# Patient Record
Sex: MALE | Race: WHITE | NOT HISPANIC OR LATINO | Employment: FULL TIME | URBAN - METROPOLITAN AREA
[De-identification: names, ages, dates, MRNs, and addresses within clinical notes are randomized per-mention and may not be internally consistent; named-entity substitution may affect disease eponyms.]

---

## 2018-05-28 ENCOUNTER — CLINICAL SUPPORT (OUTPATIENT)
Dept: PRIMARY CARE CLINIC | Facility: CLINIC | Age: 28
End: 2018-05-28
Payer: COMMERCIAL

## 2018-05-28 ENCOUNTER — OFFICE VISIT (OUTPATIENT)
Dept: PRIMARY CARE CLINIC | Facility: CLINIC | Age: 28
End: 2018-05-28
Payer: COMMERCIAL

## 2018-05-28 VITALS
RESPIRATION RATE: 18 BRPM | SYSTOLIC BLOOD PRESSURE: 130 MMHG | OXYGEN SATURATION: 97 % | HEART RATE: 88 BPM | BODY MASS INDEX: 31.46 KG/M2 | DIASTOLIC BLOOD PRESSURE: 71 MMHG | TEMPERATURE: 99 F | WEIGHT: 224.69 LBS | HEIGHT: 71 IN

## 2018-05-28 DIAGNOSIS — R21 PENILE RASH: Primary | ICD-10-CM

## 2018-05-28 DIAGNOSIS — Z13.220 LIPID SCREENING: ICD-10-CM

## 2018-05-28 DIAGNOSIS — R21 PENILE RASH: ICD-10-CM

## 2018-05-28 LAB
ALBUMIN SERPL BCP-MCNC: 4.7 G/DL
ALP SERPL-CCNC: 58 U/L
ALT SERPL W/O P-5'-P-CCNC: 13 U/L
ANION GAP SERPL CALC-SCNC: 7 MMOL/L
AST SERPL-CCNC: 24 U/L
BASOPHILS # BLD AUTO: 0 K/UL
BASOPHILS NFR BLD: 0.7 %
BILIRUB SERPL-MCNC: 1 MG/DL
BUN SERPL-MCNC: 17 MG/DL
CALCIUM SERPL-MCNC: 9.6 MG/DL
CHLORIDE SERPL-SCNC: 106 MMOL/L
CHOLEST SERPL-MCNC: 205 MG/DL
CHOLEST/HDLC SERPL: 3.7 {RATIO}
CO2 SERPL-SCNC: 30 MMOL/L
CREAT SERPL-MCNC: 1 MG/DL
DIFFERENTIAL METHOD: ABNORMAL
EOSINOPHIL # BLD AUTO: 0.1 K/UL
EOSINOPHIL NFR BLD: 1.5 %
ERYTHROCYTE [DISTWIDTH] IN BLOOD BY AUTOMATED COUNT: 13.3 %
ERYTHROCYTE [SEDIMENTATION RATE] IN BLOOD BY WESTERGREN METHOD: 3 MM/HR
EST. GFR  (AFRICAN AMERICAN): >60 ML/MIN/1.73 M^2
EST. GFR  (NON AFRICAN AMERICAN): >60 ML/MIN/1.73 M^2
GLUCOSE SERPL-MCNC: 85 MG/DL
HCT VFR BLD AUTO: 46.5 %
HDLC SERPL-MCNC: 55 MG/DL
HDLC SERPL: 26.8 %
HGB BLD-MCNC: 15.7 G/DL
LDLC SERPL CALC-MCNC: 134 MG/DL
LYMPHOCYTES # BLD AUTO: 2.2 K/UL
LYMPHOCYTES NFR BLD: 40.4 %
MCH RBC QN AUTO: 30.8 PG
MCHC RBC AUTO-ENTMCNC: 33.9 G/DL
MCV RBC AUTO: 91 FL
MONOCYTES # BLD AUTO: 0.5 K/UL
MONOCYTES NFR BLD: 9.1 %
NEUTROPHILS # BLD AUTO: 2.6 K/UL
NEUTROPHILS NFR BLD: 48.3 %
NONHDLC SERPL-MCNC: 150 MG/DL
PLATELET # BLD AUTO: 252 K/UL
PMV BLD AUTO: 8.2 FL
POTASSIUM SERPL-SCNC: 4.3 MMOL/L
PROT SERPL-MCNC: 7.8 G/DL
RBC # BLD AUTO: 5.12 M/UL
SODIUM SERPL-SCNC: 143 MMOL/L
TRIGL SERPL-MCNC: 79 MG/DL
WBC # BLD AUTO: 5.5 K/UL

## 2018-05-28 PROCEDURE — 99999 PR PBB SHADOW E&M-EST. PATIENT-LVL II: CPT | Mod: PBBFAC,,,

## 2018-05-28 PROCEDURE — 86038 ANTINUCLEAR ANTIBODIES: CPT

## 2018-05-28 PROCEDURE — 99202 OFFICE O/P NEW SF 15 MIN: CPT | Mod: S$GLB,,, | Performed by: INTERNAL MEDICINE

## 2018-05-28 PROCEDURE — 99999 PR PBB SHADOW E&M-NEW PATIENT-LVL III: CPT | Mod: PBBFAC,,, | Performed by: INTERNAL MEDICINE

## 2018-05-28 PROCEDURE — 3008F BODY MASS INDEX DOCD: CPT | Mod: CPTII,S$GLB,, | Performed by: INTERNAL MEDICINE

## 2018-05-28 PROCEDURE — 86694 HERPES SIMPLEX NES ANTBDY: CPT

## 2018-05-28 PROCEDURE — 86592 SYPHILIS TEST NON-TREP QUAL: CPT

## 2018-05-28 RX ORDER — CLOTRIMAZOLE AND BETAMETHASONE DIPROPIONATE 10; .64 MG/G; MG/G
CREAM TOPICAL 2 TIMES DAILY
Qty: 30 G | Refills: 1 | Status: SHIPPED | OUTPATIENT
Start: 2018-05-28

## 2018-05-29 LAB
ANA SER-ACNC: NORMAL
RPR SER QL: NORMAL

## 2018-05-30 LAB — HSV AB, IGM BY EIA: NEGATIVE

## 2018-05-30 NOTE — PROGRESS NOTES
Subjective:       Patient ID: Jagdeep Chin is a 28 y.o. male.    Chief Complaint: Sores On Genital Area    HPI  Pt c/o sores on the penis last 1-2 weeks seem spreading no pain no itching no penile d/c no swollen glands no h/o STD no other skin rash n joint pain no fever chill not on nay meds denies any PMH  Review of Systems   Constitutional: Negative for unexpected weight change.   HENT: Negative for congestion, nosebleeds and rhinorrhea.    Eyes: Negative for visual disturbance.   Respiratory: Negative for cough, choking and chest tightness.    Cardiovascular: Negative for chest pain and palpitations.   Gastrointestinal: Negative for abdominal pain, constipation and diarrhea.   Genitourinary: Negative for discharge, dysuria, testicular pain and urgency.   Musculoskeletal: Negative for arthralgias and myalgias.   Skin: Negative for rash.   Neurological: Negative.    Psychiatric/Behavioral: Negative for agitation and behavioral problems. The patient is not nervous/anxious.        Objective:      Physical Exam   Constitutional: He is oriented to person, place, and time. He appears well-developed and well-nourished. No distress.   HENT:   Head: Normocephalic and atraumatic.   Right Ear: External ear normal.   Left Ear: External ear normal.   Nose: Nose normal.   Mouth/Throat: Oropharynx is clear and moist. No oropharyngeal exudate.   Eyes: Conjunctivae and EOM are normal. Pupils are equal, round, and reactive to light. Right eye exhibits no discharge. Left eye exhibits no discharge.   Neck: Normal range of motion. Neck supple. No thyromegaly present.   Cardiovascular: Normal rate, regular rhythm, normal heart sounds and intact distal pulses.  Exam reveals no gallop and no friction rub.    No murmur heard.  Pulmonary/Chest: Effort normal and breath sounds normal. No respiratory distress. He has no wheezes. He has no rales. He exhibits no tenderness.   Abdominal: Soft. Bowel sounds are normal. He exhibits no  distension. There is no tenderness. There is no rebound and no guarding.   Genitourinary:   Genitourinary Comments: Circular sl erythematous rash with sl raised border confluent no pain no penile d/c no lymphadenopathy in inguinal   Musculoskeletal: Normal range of motion. He exhibits no edema, tenderness or deformity.   Lymphadenopathy:     He has no cervical adenopathy.   Neurological: He is alert and oriented to person, place, and time.   Skin: Skin is warm and dry. Capillary refill takes less than 2 seconds. No rash noted. No erythema.   Psychiatric: He has a normal mood and affect. Judgment and thought content normal.   Nursing note and vitals reviewed.      Assessment:       1. Penile rash    2. Lipid screening        Plan:       Penile rash  -     CBC auto differential; Future; Expected date: 05/28/2018  -     Comprehensive metabolic panel; Future; Expected date: 05/28/2018  -     RPR; Future; Expected date: 05/28/2018  -     HERVE; Future; Expected date: 05/28/2018  -     Sedimentation rate, manual; Future; Expected date: 05/28/2018  -     HERPES SIMPLEX 1 & 2 IGM; Future; Expected date: 05/28/2018  -     clotrimazole-betamethasone 1-0.05% (LOTRISONE) cream; Apply topically 2 (two) times daily.  Dispense: 30 g; Refill: 1    Lipid screening  -     Lipid panel; Future; Expected date: 05/28/2018

## 2022-09-01 NOTE — PROGRESS NOTES
ALLERGY & IMMUNOLOGY CLINIC - INITIAL CONSULTATION     HISTORY OF PRESENT ILLNESS     Patient ID: Jagdeep Chin is a 32 y.o. male    CC: urticaria     HPI: Jagdeep Chin is a 32 y.o. male presenting for urticaria.    Symptoms occurred on Tuesday and Wednesday last week. Had dinner at a friend's, on the way home felt itchy. Then when he got home he saw the hives. He took benadryl and symptoms improved. Happened again the next night after getting home from work. It got better with benadryl again. Has not happened again. He thinks they had used a new scent bead laundry fragrance. They stopped using it and he hasn't had any issues since. He had hives once before over a year ago.     When he had dinner at friend's house, he had chicken enchiladas. He thinks symptoms started maybe 45 minutes to an hour later.  The next night, he ate a tuna fish sandwich, and symptoms stated 20-30 minutes later.   He had alcohol the first night, not the second. No NSAIDs. No exercise after eating.    He has tolerated fish, corn, wheat, chicken since then. He does not think there were any common ingredients between the two nights.    No angioedema.  Lesions were pruritic, not painful.  Individual urticarial lesions lasted maybe a few hours; they didn't leave a manuel.    Patient denies fevers, chills, night sweats, unexplained weight loss, blood in stool, melena, easy bruising or bleeding, unusual lumps or bumps, and hot/cold intolerance.    He does not think he has a URI before symptoms started.     REVIEW OF SYSTEMS     CONST: no F/C/NS, no unexplained weight changes  PULM: no SOB, no wheezing, no cough  GI: no BRBPR/melena  H/O: no easy bruising or bleeding  DERM: no current rashes, no skin breaks     MEDICAL HISTORY     Vaccines:     There is no immunization history on file for this patient.    MedHx:   There is no problem list on file for this patient.      SurgHx:   History reviewed. No pertinent surgical history.    Medications:  "  Current Outpatient Medications on File Prior to Visit   Medication Sig Dispense Refill    clotrimazole-betamethasone 1-0.05% (LOTRISONE) cream Apply topically 2 (two) times daily. (Patient not taking: Reported on 9/2/2022) 30 g 1     No current facility-administered medications on file prior to visit.     Drug Allergies: Review of patient's allergies indicates:  No Known Allergies     SocHx:   Social History     Tobacco Use    Smoking status: Never    Smokeless tobacco: Never   Substance Use Topics    Alcohol use: Yes     Comment: ocasionally    Drug use: No     FamHx:   Family History   Problem Relation Age of Onset    Urticaria Neg Hx     Allergies Neg Hx         PHYSICAL EXAM     VS: Ht 5' 11" (1.803 m)   Wt 92.5 kg (203 lb 14.8 oz)   BMI 28.44 kg/m²   GENERAL: Alert, NAD, well-appearing, cooperative  EYES: EOMI, no conjunctival injection, no discharge, no infraorbital shiners  ORAL: MMM, no ulcers, no thrush  NECK: no thyromegaly, no LAD  LUNGS: CTAB, no w/r/c, no increased WOB  HEART: RRR, normal S1/S2, no m/g/r  DERM: no rashes, no skin breaks  NEURO: normal speech, normal gait, no facial asymmetry     LABORATORY STUDIES     Component      Latest Ref Rng & Units 5/28/2018   WBC      3.90 - 12.70 K/uL 5.50   RBC      4.60 - 6.20 M/uL 5.12   Hemoglobin      14.0 - 18.0 g/dL 15.7   Hematocrit      40.0 - 54.0 % 46.5   MCV      82 - 98 fL 91   MCH      27.0 - 31.0 pg 30.8   MCHC      32.0 - 36.0 g/dL 33.9   RDW      11.5 - 14.5 % 13.3   Platelets      150 - 350 K/uL 252   MPV      9.2 - 12.9 fL 8.2 (L)   Gran # (ANC)      1.8 - 7.7 K/uL 2.6   Lymph #      1.0 - 4.8 K/uL 2.2   Mono #      0.3 - 1.0 K/uL 0.5   Eos #      0.0 - 0.5 K/uL 0.1   Baso #      0.00 - 0.20 K/uL 0.00   Differential Method       Automated   Sodium      136 - 145 mmol/L 143   Potassium      3.5 - 5.1 mmol/L 4.3   Chloride      101 - 111 mmol/L 106   CO2      23 - 29 mmol/L 30 (H)   Glucose      74 - 118 mg/dL 85   BUN      6 - 20 mg/dL 17 "   Creatinine      0.5 - 1.4 mg/dL 1.0   Calcium      8.6 - 10.0 mg/dL 9.6   PROTEIN TOTAL      6.0 - 8.4 g/dL 7.8   Albumin      3.5 - 5.2 g/dL 4.7   BILIRUBIN TOTAL      0.3 - 1.2 mg/dL 1.0   Alkaline Phosphatase      38 - 126 U/L 58   AST      15 - 41 U/L 24   ALT      17 - 63 U/L 13 (L)   RPR      Non-reactive Non-reactive   HERVE      Negative <1:160 Negative <1:160   Sed Rate      0 - 10 mm/Hr 3      CHART REVIEW     Reviewed pcp note, labs.     ASSESSMENT & PLAN     Jagdeep Chin is a 32 y.o. male with     # Urticaria: Occurred 2 nights in a row last week, but hasn't recurred since. Both occurences were after dinner, but there were no similar ingredients between the 2 nights, and he has tolerated all ingredients since. Therefore, I do not believe this was due to food allergy. Patient was concerned that it might be due to scented beads they put in laundry, but I think it more likely that his symptoms were spontaneous. He also had urticaria once before over a year ago. Symptoms were helped by benadryl.  -advised that if urticaria occur again he can take cetirizine 20 mg (up to 20 mg BID if needed)  -if symptoms recur and are difficult to control, advised that he schedule follow up with me  -also discussed some ways to help tell the difference between spontaneous urticaria and an allergic reaction     Follow up: as needed      Aida Ashley MD  Allergy/Immunology

## 2022-09-02 ENCOUNTER — OFFICE VISIT (OUTPATIENT)
Dept: ALLERGY | Facility: CLINIC | Age: 32
End: 2022-09-02
Payer: COMMERCIAL

## 2022-09-02 VITALS — HEIGHT: 71 IN | WEIGHT: 203.94 LBS | BODY MASS INDEX: 28.55 KG/M2

## 2022-09-02 DIAGNOSIS — L50.9 URTICARIA: Primary | ICD-10-CM

## 2022-09-02 PROCEDURE — 1159F PR MEDICATION LIST DOCUMENTED IN MEDICAL RECORD: ICD-10-PCS | Mod: CPTII,S$GLB,, | Performed by: STUDENT IN AN ORGANIZED HEALTH CARE EDUCATION/TRAINING PROGRAM

## 2022-09-02 PROCEDURE — 99204 OFFICE O/P NEW MOD 45 MIN: CPT | Mod: S$GLB,,, | Performed by: STUDENT IN AN ORGANIZED HEALTH CARE EDUCATION/TRAINING PROGRAM

## 2022-09-02 PROCEDURE — 1160F PR REVIEW ALL MEDS BY PRESCRIBER/CLIN PHARMACIST DOCUMENTED: ICD-10-PCS | Mod: CPTII,S$GLB,, | Performed by: STUDENT IN AN ORGANIZED HEALTH CARE EDUCATION/TRAINING PROGRAM

## 2022-09-02 PROCEDURE — 99204 PR OFFICE/OUTPT VISIT, NEW, LEVL IV, 45-59 MIN: ICD-10-PCS | Mod: S$GLB,,, | Performed by: STUDENT IN AN ORGANIZED HEALTH CARE EDUCATION/TRAINING PROGRAM

## 2022-09-02 PROCEDURE — 1160F RVW MEDS BY RX/DR IN RCRD: CPT | Mod: CPTII,S$GLB,, | Performed by: STUDENT IN AN ORGANIZED HEALTH CARE EDUCATION/TRAINING PROGRAM

## 2022-09-02 PROCEDURE — 3008F BODY MASS INDEX DOCD: CPT | Mod: CPTII,S$GLB,, | Performed by: STUDENT IN AN ORGANIZED HEALTH CARE EDUCATION/TRAINING PROGRAM

## 2022-09-02 PROCEDURE — 99999 PR PBB SHADOW E&M-EST. PATIENT-LVL III: ICD-10-PCS | Mod: PBBFAC,,, | Performed by: STUDENT IN AN ORGANIZED HEALTH CARE EDUCATION/TRAINING PROGRAM

## 2022-09-02 PROCEDURE — 1159F MED LIST DOCD IN RCRD: CPT | Mod: CPTII,S$GLB,, | Performed by: STUDENT IN AN ORGANIZED HEALTH CARE EDUCATION/TRAINING PROGRAM

## 2022-09-02 PROCEDURE — 99999 PR PBB SHADOW E&M-EST. PATIENT-LVL III: CPT | Mod: PBBFAC,,, | Performed by: STUDENT IN AN ORGANIZED HEALTH CARE EDUCATION/TRAINING PROGRAM

## 2022-09-02 PROCEDURE — 3008F PR BODY MASS INDEX (BMI) DOCUMENTED: ICD-10-PCS | Mod: CPTII,S$GLB,, | Performed by: STUDENT IN AN ORGANIZED HEALTH CARE EDUCATION/TRAINING PROGRAM

## 2022-11-12 ENCOUNTER — OFFICE VISIT (OUTPATIENT)
Dept: URGENT CARE | Facility: CLINIC | Age: 32
End: 2022-11-12
Payer: COMMERCIAL

## 2022-11-12 VITALS
RESPIRATION RATE: 18 BRPM | DIASTOLIC BLOOD PRESSURE: 72 MMHG | HEIGHT: 71 IN | TEMPERATURE: 99 F | BODY MASS INDEX: 28 KG/M2 | OXYGEN SATURATION: 96 % | HEART RATE: 83 BPM | SYSTOLIC BLOOD PRESSURE: 104 MMHG | WEIGHT: 200 LBS

## 2022-11-12 DIAGNOSIS — B96.89 ACUTE BACTERIAL RHINOSINUSITIS: Primary | ICD-10-CM

## 2022-11-12 DIAGNOSIS — R05.8 COUGH WITH CONGESTION OF PARANASAL SINUS: ICD-10-CM

## 2022-11-12 DIAGNOSIS — R09.82 PND (POST-NASAL DRIP): ICD-10-CM

## 2022-11-12 DIAGNOSIS — R09.81 COUGH WITH CONGESTION OF PARANASAL SINUS: ICD-10-CM

## 2022-11-12 DIAGNOSIS — J01.90 ACUTE BACTERIAL RHINOSINUSITIS: Primary | ICD-10-CM

## 2022-11-12 DIAGNOSIS — J02.9 SORE THROAT: ICD-10-CM

## 2022-11-12 PROCEDURE — 99214 OFFICE O/P EST MOD 30 MIN: CPT | Mod: S$GLB,,, | Performed by: PHYSICIAN ASSISTANT

## 2022-11-12 PROCEDURE — 99214 PR OFFICE/OUTPT VISIT, EST, LEVL IV, 30-39 MIN: ICD-10-PCS | Mod: S$GLB,,, | Performed by: PHYSICIAN ASSISTANT

## 2022-11-12 PROCEDURE — 1159F MED LIST DOCD IN RCRD: CPT | Mod: CPTII,S$GLB,, | Performed by: PHYSICIAN ASSISTANT

## 2022-11-12 PROCEDURE — 1159F PR MEDICATION LIST DOCUMENTED IN MEDICAL RECORD: ICD-10-PCS | Mod: CPTII,S$GLB,, | Performed by: PHYSICIAN ASSISTANT

## 2022-11-12 PROCEDURE — 3078F PR MOST RECENT DIASTOLIC BLOOD PRESSURE < 80 MM HG: ICD-10-PCS | Mod: CPTII,S$GLB,, | Performed by: PHYSICIAN ASSISTANT

## 2022-11-12 PROCEDURE — 3074F PR MOST RECENT SYSTOLIC BLOOD PRESSURE < 130 MM HG: ICD-10-PCS | Mod: CPTII,S$GLB,, | Performed by: PHYSICIAN ASSISTANT

## 2022-11-12 PROCEDURE — 3008F BODY MASS INDEX DOCD: CPT | Mod: CPTII,S$GLB,, | Performed by: PHYSICIAN ASSISTANT

## 2022-11-12 PROCEDURE — 3008F PR BODY MASS INDEX (BMI) DOCUMENTED: ICD-10-PCS | Mod: CPTII,S$GLB,, | Performed by: PHYSICIAN ASSISTANT

## 2022-11-12 PROCEDURE — 3078F DIAST BP <80 MM HG: CPT | Mod: CPTII,S$GLB,, | Performed by: PHYSICIAN ASSISTANT

## 2022-11-12 PROCEDURE — 3074F SYST BP LT 130 MM HG: CPT | Mod: CPTII,S$GLB,, | Performed by: PHYSICIAN ASSISTANT

## 2022-11-12 RX ORDER — AMOXICILLIN AND CLAVULANATE POTASSIUM 875; 125 MG/1; MG/1
1 TABLET, FILM COATED ORAL EVERY 12 HOURS
Qty: 14 TABLET | Refills: 0 | Status: SHIPPED | OUTPATIENT
Start: 2022-11-12 | End: 2022-11-19

## 2022-11-12 NOTE — PROGRESS NOTES
"Subjective:       Patient ID: Jagdeep Chin is a 32 y.o. male.    Vitals:  height is 5' 11" (1.803 m) and weight is 90.7 kg (200 lb). His temperature is 98.6 °F (37 °C). His blood pressure is 104/72 and his pulse is 83. His respiration is 18 and oxygen saturation is 96%.     Chief Complaint: Cough    This is a 32 y.o. male who is already back in for COVID-19 presents today with symptoms that started 10 days ago. Pt also has a scratchy throat, significant postnasal drip, nasal/sinus congestion, runny nose, sneezing, and mildly productive cough that is worse in the morning.  Also symptoms and especially the cough are worse at night. Treated with previously prescribed promethazine cough syrup from PCP flu infection 1 month ago and mucinex.  He just started taking medication the last 3 days since symptoms worsen.  No fever, chills, chest pain, shortness   Of breath or any other associated symptoms.      Cough  This is a new problem. Episode onset: 10 days. The problem has been gradually worsening. The cough is Productive of sputum. Associated symptoms include nasal congestion, postnasal drip, rhinorrhea and a sore throat. Pertinent negatives include no chest pain, chills, ear congestion, ear pain, eye redness, fever, headaches, heartburn, myalgias, rash, shortness of breath or wheezing. Treatments tried: cough syrup and mucinex. The treatment provided mild relief. There is no history of asthma, bronchitis or COPD.     Constitution: Negative for activity change, chills, sweating, fatigue, fever and generalized weakness.   HENT:  Positive for congestion, postnasal drip, sinus pressure and sore throat. Negative for ear pain, hearing loss, facial swelling, sinus pain, trouble swallowing and voice change.    Neck: Negative for neck pain, neck stiffness and painful lymph nodes.   Cardiovascular:  Negative for chest pain, leg swelling, palpitations, sob on exertion and passing out.   Eyes:  Negative for eye discharge, eye " pain, eye redness, photophobia, vision loss, double vision, blurred vision and eyelid swelling.   Respiratory:  Positive for cough and sputum production. Negative for chest tightness, COPD, shortness of breath, wheezing and asthma.    Gastrointestinal:  Negative for abdominal pain, nausea, vomiting, diarrhea, bright red blood in stool, dark colored stools, rectal bleeding, heartburn and bowel incontinence.   Genitourinary:  Negative for dysuria, frequency, urgency, urine decreased, flank pain, bladder incontinence, hematuria and history of kidney stones.   Musculoskeletal:  Negative for trauma, joint pain, joint swelling, abnormal ROM of joint, muscle cramps and muscle ache.   Skin:  Negative for color change, pale, rash and wound.   Allergic/Immunologic: Negative for seasonal allergies, asthma and immunocompromised state.   Neurological:  Negative for dizziness, history of vertigo, light-headedness, passing out, facial drooping, speech difficulty, coordination disturbances, loss of balance, headaches, disorientation, altered mental status, loss of consciousness, numbness, tingling and seizures.   Hematologic/Lymphatic: Negative for swollen lymph nodes, easy bruising/bleeding and trouble clotting. Does not bruise/bleed easily.   Psychiatric/Behavioral:  Negative for altered mental status and disorientation.        Past Medical History:   Diagnosis Date    Urticaria        Objective:      Physical Exam   Constitutional: He is oriented to person, place, and time. He appears well-developed. He is cooperative.  Non-toxic appearance. He does not appear ill. No distress.      Comments:Well-appearing     HENT:   Head: Normocephalic and atraumatic.   Ears:   Right Ear: Hearing, external ear and ear canal normal. No no drainage, swelling or tenderness.   Left Ear: Hearing, external ear and ear canal normal. No no drainage, swelling or tenderness.   Nose: Congestion present. No rhinorrhea or purulent discharge. Right sinus  exhibits no maxillary sinus tenderness and no frontal sinus tenderness. Left sinus exhibits no maxillary sinus tenderness and no frontal sinus tenderness.   Mouth/Throat: Uvula is midline, oropharynx is clear and moist and mucous membranes are normal. Mucous membranes are moist. No oral lesions. No trismus in the jaw. No uvula swelling. No oropharyngeal exudate, posterior oropharyngeal edema or posterior oropharyngeal erythema. No tonsillar exudate. Oropharynx is clear.      Comments: Cobblestoning present posterior oropharynx  Eyes: Conjunctivae, EOM and lids are normal. Pupils are equal, round, and reactive to light. No visual field deficit is present. Right eye exhibits no discharge. Left eye exhibits no discharge. Right conjunctiva is not injected. Right conjunctiva has no hemorrhage. Left conjunctiva is not injected. Left conjunctiva has no hemorrhage. Extraocular movement intact vision grossly intact gaze aligned appropriately   Neck: Neck supple. No neck rigidity present.   Cardiovascular: Normal rate, regular rhythm, normal heart sounds and normal pulses.   No murmur heard.  Pulmonary/Chest: Effort normal and breath sounds normal. No accessory muscle usage or stridor. No respiratory distress. He has no wheezes. He exhibits no tenderness.   Abdominal: Normal appearance. He exhibits no distension and no mass. Soft. There is no abdominal tenderness. There is no rebound and no guarding.   Musculoskeletal: Normal range of motion.         General: Normal range of motion.      Right lower leg: No edema.      Left lower leg: No edema.      Comments: Moves all extremities with normal tone, strength, and ROM.  Gait normal.   Lymphadenopathy:     He has no cervical adenopathy.   Neurological: no focal deficit. He is alert, oriented to person, place, and time and at baseline. He has normal motor skills and normal sensation. He displays no weakness, facial symmetry, normal reflexes and no dysarthria. No cranial nerve  deficit or sensory deficit. He exhibits normal muscle tone. He has a normal Finger-Nose-Finger Test. Coordination: Heel to shin test normal. He shows no pronator drift. He displays no seizure activity. Gait and coordination normal. Coordination normal. GCS eye subscore is 4. GCS verbal subscore is 5. GCS motor subscore is 6.   Skin: Skin is warm, dry, not diaphoretic and no rash. Capillary refill takes less than 2 seconds.   Psychiatric: His speech is normal and behavior is normal. Thought content normal.   Nursing note and vitals reviewed.      Assessment:       1. Acute bacterial rhinosinusitis    2. Cough with congestion of paranasal sinus    3. Sore throat    4. PND (post-nasal drip)          Nontoxic appearing. Vitals are stable.Patient has symptoms at this time which is consistent with above diagnosis.      Patient was recommended OTC treatments for their symptoms.  Add Sudafed every 4-6 hours, Zyrtec/Claritin daily, Flonase twice daily, and Mucinex DM q.12 hours in addition to his previously prescribed promethazine cough syrup at night.  Patient was also prescribed Augmentin course for their symptoms.      Patient was counseled,  expected course, and answered all of questions.  Printed and verbal treatment guidelines/recommendations were given.   Recommend follow-up PCP in the next 2-3 days if new or worsening symptoms.    Patient understands that they received an Urgent Care treatment only and that they may be released before all your medical problems are known or treated. Strict ED versus clinic precautions given.  Patient verbalized understanding and agreed with plan of care.    Note dictated with voice recognition software, please excuse any grammatical errors.    Plan:         Acute bacterial rhinosinusitis  -     amoxicillin-clavulanate 875-125mg (AUGMENTIN) 875-125 mg per tablet; Take 1 tablet by mouth every 12 (twelve) hours. for 7 days  Dispense: 14 tablet; Refill: 0    Cough with congestion of  paranasal sinus    Sore throat    PND (post-nasal drip)            Additional MDM:     Heart Failure Score:   COPD = No    Patient Instructions     PLEASE READ YOUR DISCHARGE INSTRUCTIONS ENTIRELY AS IT CONTAINS IMPORTANT INFORMATION.      - Reviewed radiographs and all diagnostic testing with patient/family.    - Rest.  Drink plenty of fluids.    - Tylenol OR anti-inflammatory (NSAIDs, ibuprofen, aleve, motrin) as directed as needed for fever/pain.  For Tylenol, do not exceed 3000 mg/ day. If no contraindication or allergies.  -OK to supplement with OTC DayQuil, NyQuil or TheraFlu every 6 hours as needed for cough and congestion.  Use caution of total amount of Tylenol/acetaminophen per day.    - If you were prescribed antibiotics, please take them to completion. Please supplement with OTC probiotics and yogurt.  Contact clinic if develop profuse diarrhea and weakness.    -Below are suggestions for symptomatic relief:              -Salt water gargles to soothe throat pain.              -Chloroseptic spray also helps to numb throat pain.              -Nasal saline spray reduces inflammation and dryness.              -Warm face compresses to help with facial sinus pain/pressure.              -Vicks vapor rub at night.             **may also supplement with OTC nasal spray to help with inflammation and congestion.   Wean to off when you nose becomes to dry or bleed. Also use nasal saline twice a day to help with dryness.               -Flonase OTC or Nasacort OTC  once or twice a day for nasal/sinus congestion. DON'T USE IF YOU HAVE GLAUCOMA. CHECK WITH YOUR PHARMACIST/PHYSICIAN.              -Simple foods like chicken noodle soup.              -Mucinex DM (ANY COUGH EXPECTORANT-- guaifenesin) for cough or chest congestion with mucus during the day time. Delsym or robitussin (ANY COUGH SUPPRESSANT- dextromethorphan) helps with coughing at night. Mucinex-DM if you have chest congestion or sputum (caution if history of  high blood pressure or palpitations).              -Zyrtec/Claritin/xyzal during the day time  & Benadryl at night (only if severe runny nose) may help with allergies and runny nose. Add decongestant if you have nasal/sinus congestion/sinus pressure/ear fullness sensation. (see below)              -may take OTC meclizine as needed for dizziness or nausea.     Caution with use of Decongestant meds:  -If you DO NOT have Hypertension or any history of palpitations, it is ok to take over the counter Sudafed or Mucinex D or Allegra-D or Claritin-D or Zyrtec-D.  -If you do take one of the above, it is ok to combine that with plain over the counter Mucinex or Allegra or Claritin or Zyrtec. If, for example, you are taking Zyrtec -D, you can combine that with Mucinex, but not Mucinex-D.  If you are taking Mucinex-D, you can combine that with plain Allegra or Claritin or Zyrtec.     -Do not combine pseudophed or phenylephrine with any other brand allergy-D for DECONGESTANT.   -Or vice versa, you can you take plain allergy medications (allegra/claritin/zyrtec with NO Decongestant) and ADD OTC pseudophed or phenelyphrine 2-3 times a day (or every 4-6 hours needed). Avoid taking decongestant late at night or with caffeine as it can keep you up or cause jittery feeling.     -If you DO have Hypertension , anxiety, or palpitations, it is safe to take Coricidin HBP for relief of cough, congestion, or sinus symptoms.    -You must understand that you've received an Urgent Care treatment only and that you may be released before all your medical problems are known or treated. You, the patient, will arrange for follow up care as instructed. Please arrange follow up with your primary medical clinic within 2-5 days if your signs and symptoms have not resolved or worsen.     - Follow up with your PCP or specialty clinic as directed.  You can call [phone] or [phone] to schedule an appointment with the appropriate provider.   Schedule [facility] is open Mon-Friday 8-5pm (excluded holidays).    - If your condition worsens or fails to improve we recommend that you receive another evaluation at the emergency room immediately or contact your primary medical clinic to discuss your concerns.

## 2022-11-12 NOTE — PATIENT INSTRUCTIONS
PLEASE READ YOUR DISCHARGE INSTRUCTIONS ENTIRELY AS IT CONTAINS IMPORTANT INFORMATION.      - Reviewed radiographs and all diagnostic testing with patient/family.    - Rest.  Drink plenty of fluids.    - Tylenol OR anti-inflammatory (NSAIDs, ibuprofen, aleve, motrin) as directed as needed for fever/pain.  For Tylenol, do not exceed 3000 mg/ day. If no contraindication or allergies.  -OK to supplement with OTC DayQuil, NyQuil or TheraFlu every 6 hours as needed for cough and congestion.  Use caution of total amount of Tylenol/acetaminophen per day.    - If you were prescribed antibiotics, please take them to completion. Please supplement with OTC probiotics and yogurt.  Contact clinic if develop profuse diarrhea and weakness.    -Below are suggestions for symptomatic relief:              -Salt water gargles to soothe throat pain.              -Chloroseptic spray also helps to numb throat pain.              -Nasal saline spray reduces inflammation and dryness.              -Warm face compresses to help with facial sinus pain/pressure.              -Vicks vapor rub at night.             **may also supplement with OTC nasal spray to help with inflammation and congestion.   Wean to off when you nose becomes to dry or bleed. Also use nasal saline twice a day to help with dryness.               -Flonase OTC or Nasacort OTC  once or twice a day for nasal/sinus congestion. DON'T USE IF YOU HAVE GLAUCOMA. CHECK WITH YOUR PHARMACIST/PHYSICIAN.              -Simple foods like chicken noodle soup.              -Mucinex DM (ANY COUGH EXPECTORANT-- guaifenesin) for cough or chest congestion with mucus during the day time. Delsym or robitussin (ANY COUGH SUPPRESSANT- dextromethorphan) helps with coughing at night. Mucinex-DM if you have chest congestion or sputum (caution if history of high blood pressure or palpitations).              -Zyrtec/Claritin/xyzal during the day time  & Benadryl at night (only if severe runny nose) may  help with allergies and runny nose. Add decongestant if you have nasal/sinus congestion/sinus pressure/ear fullness sensation. (see below)              -may take OTC meclizine as needed for dizziness or nausea.     Caution with use of Decongestant meds:  -If you DO NOT have Hypertension or any history of palpitations, it is ok to take over the counter Sudafed or Mucinex D or Allegra-D or Claritin-D or Zyrtec-D.  -If you do take one of the above, it is ok to combine that with plain over the counter Mucinex or Allegra or Claritin or Zyrtec. If, for example, you are taking Zyrtec -D, you can combine that with Mucinex, but not Mucinex-D.  If you are taking Mucinex-D, you can combine that with plain Allegra or Claritin or Zyrtec.     -Do not combine pseudophed or phenylephrine with any other brand allergy-D for DECONGESTANT.   -Or vice versa, you can you take plain allergy medications (allegra/claritin/zyrtec with NO Decongestant) and ADD OTC pseudophed or phenelyphrine 2-3 times a day (or every 4-6 hours needed). Avoid taking decongestant late at night or with caffeine as it can keep you up or cause jittery feeling.     -If you DO have Hypertension , anxiety, or palpitations, it is safe to take Coricidin HBP for relief of cough, congestion, or sinus symptoms.    -You must understand that you've received an Urgent Care treatment only and that you may be released before all your medical problems are known or treated. You, the patient, will arrange for follow up care as instructed. Please arrange follow up with your primary medical clinic within 2-5 days if your signs and symptoms have not resolved or worsen.     - Follow up with your PCP or specialty clinic as directed.  You can call [phone] or [phone] to schedule an appointment with the appropriate provider.  Schedule [facility] is open Mon-Friday 8-5pm (excluded holidays).    - If your condition worsens or fails to improve we recommend that you receive another  evaluation at the emergency room immediately or contact your primary medical clinic to discuss your concerns.

## 2023-10-19 ENCOUNTER — OFFICE VISIT (OUTPATIENT)
Dept: UROLOGY | Facility: CLINIC | Age: 33
End: 2023-10-19
Payer: COMMERCIAL

## 2023-10-19 ENCOUNTER — LAB VISIT (OUTPATIENT)
Dept: LAB | Facility: HOSPITAL | Age: 33
End: 2023-10-19
Payer: COMMERCIAL

## 2023-10-19 VITALS
HEIGHT: 71 IN | BODY MASS INDEX: 27.72 KG/M2 | WEIGHT: 198 LBS | SYSTOLIC BLOOD PRESSURE: 112 MMHG | DIASTOLIC BLOOD PRESSURE: 69 MMHG | HEART RATE: 68 BPM

## 2023-10-19 DIAGNOSIS — E29.1 TESTICULAR FAILURE: Primary | ICD-10-CM

## 2023-10-19 DIAGNOSIS — E29.1 TESTICULAR FAILURE: ICD-10-CM

## 2023-10-19 LAB
ESTRADIOL SERPL-MCNC: 19 PG/ML (ref 11–44)
FSH SERPL-ACNC: 5.19 MIU/ML (ref 0.95–11.95)
LH SERPL-ACNC: 2 MIU/ML (ref 0.6–12.1)
PROLACTIN SERPL IA-MCNC: 7.4 NG/ML (ref 3.5–19.4)
TESTOST SERPL-MCNC: 345 NG/DL (ref 304–1227)

## 2023-10-19 PROCEDURE — 99999 PR PBB SHADOW E&M-EST. PATIENT-LVL III: CPT | Mod: PBBFAC,,, | Performed by: UROLOGY

## 2023-10-19 PROCEDURE — 82670 ASSAY OF TOTAL ESTRADIOL: CPT | Performed by: UROLOGY

## 2023-10-19 PROCEDURE — 1160F PR REVIEW ALL MEDS BY PRESCRIBER/CLIN PHARMACIST DOCUMENTED: ICD-10-PCS | Mod: CPTII,S$GLB,, | Performed by: UROLOGY

## 2023-10-19 PROCEDURE — 99203 OFFICE O/P NEW LOW 30 MIN: CPT | Mod: S$GLB,,, | Performed by: UROLOGY

## 2023-10-19 PROCEDURE — 84403 ASSAY OF TOTAL TESTOSTERONE: CPT | Performed by: UROLOGY

## 2023-10-19 PROCEDURE — 83002 ASSAY OF GONADOTROPIN (LH): CPT | Performed by: UROLOGY

## 2023-10-19 PROCEDURE — 1159F MED LIST DOCD IN RCRD: CPT | Mod: CPTII,S$GLB,, | Performed by: UROLOGY

## 2023-10-19 PROCEDURE — 83001 ASSAY OF GONADOTROPIN (FSH): CPT | Performed by: UROLOGY

## 2023-10-19 PROCEDURE — 84146 ASSAY OF PROLACTIN: CPT | Performed by: UROLOGY

## 2023-10-19 PROCEDURE — 3008F BODY MASS INDEX DOCD: CPT | Mod: CPTII,S$GLB,, | Performed by: UROLOGY

## 2023-10-19 PROCEDURE — 1160F RVW MEDS BY RX/DR IN RCRD: CPT | Mod: CPTII,S$GLB,, | Performed by: UROLOGY

## 2023-10-19 PROCEDURE — 99203 PR OFFICE/OUTPT VISIT, NEW, LEVL III, 30-44 MIN: ICD-10-PCS | Mod: S$GLB,,, | Performed by: UROLOGY

## 2023-10-19 PROCEDURE — 36415 COLL VENOUS BLD VENIPUNCTURE: CPT | Performed by: UROLOGY

## 2023-10-19 PROCEDURE — 3008F PR BODY MASS INDEX (BMI) DOCUMENTED: ICD-10-PCS | Mod: CPTII,S$GLB,, | Performed by: UROLOGY

## 2023-10-19 PROCEDURE — 99999 PR PBB SHADOW E&M-EST. PATIENT-LVL III: ICD-10-PCS | Mod: PBBFAC,,, | Performed by: UROLOGY

## 2023-10-19 PROCEDURE — 3078F PR MOST RECENT DIASTOLIC BLOOD PRESSURE < 80 MM HG: ICD-10-PCS | Mod: CPTII,S$GLB,, | Performed by: UROLOGY

## 2023-10-19 PROCEDURE — 3074F SYST BP LT 130 MM HG: CPT | Mod: CPTII,S$GLB,, | Performed by: UROLOGY

## 2023-10-19 PROCEDURE — 3078F DIAST BP <80 MM HG: CPT | Mod: CPTII,S$GLB,, | Performed by: UROLOGY

## 2023-10-19 PROCEDURE — 3074F PR MOST RECENT SYSTOLIC BLOOD PRESSURE < 130 MM HG: ICD-10-PCS | Mod: CPTII,S$GLB,, | Performed by: UROLOGY

## 2023-10-19 PROCEDURE — 1159F PR MEDICATION LIST DOCUMENTED IN MEDICAL RECORD: ICD-10-PCS | Mod: CPTII,S$GLB,, | Performed by: UROLOGY

## 2023-10-19 NOTE — PROGRESS NOTES
"Chief Complaint:  Infertility    HPI:    Mr. Chin is a 33 y.o.  male who has been  to his wife for the past 6 years. They have been trying to achieve a pregnancy for the past 1 years but without success. Jagdeep Chin has not undergone a semen analysis. He denies a history of erectile dysfunction and ejaculatory problems.    He has achieved 0 pregnancies in the past.    Armida Chin is 33 years old. ( [birth year]) Her menses are regular. She has not undergone prior hysterosalpingogram. She has achieved 0 prior pregnancies.  She sees Dr. Orr    The couple has not undergone prior intrauterine insemination procedures.    The couple has not undergone prior in-vitro fertilization procedures.    Jagdeep Chin denies a history of exposure to harmful chemicals, toxins, and radiation.    No history of recent fevers greater than 101.5 degrees Farenheit.    No history of recent exposure to "wet heat."    No history of urological trauma or testicular torsion.    No history of prostatitis, epididymitis, and orchitis.    No history of post-pubertal mumps.    There is no known family history of fertility problems.    REVIEW OF SYSTEMS:     He denies headache, blurred vision, fever, nausea, vomiting, chills, abdominal pain, chest pain, sore throat, bleeding per rectum, cough, SOB, recent loss of consciousness, recent mental status changes, seizures, dizziness, or upper or lower extremity weakness.    PHYSICAL EXAM:     The patient generally appears in good health, is appropriately interactive, and is in no apparent distress.     Eyes: anicteric sclerae, moist conjunctivae; no lid-lag; PERRLA     HENT: Atraumatic; oropharynx clear with moist mucous membranes and no mucosal ulcerations;normal hard and soft palate.  No evidence of lymphadenopathy.    Neck: Trachea midline.  No thyromegaly.    Skin: No lesions.    Mental: Cooperative with normal affect.  Is oriented to time, place, and person.    Neuro: Grossly " "intact.    Chest: Normal inspiratory effort.   No accessory muscles.  No audible wheezes.  Respirations symmetric on inspiration and expiration.    Heart: Regular rhythm.      Abdomen:  Soft, non-tender. No masses or organomegaly. Bladder is not palpable. No evidence of flank discomfort. No evidence of inguinal hernia.    Genitourinary: Penis is normal with no evidence of plaques or induration. Urethral meatus is normal. Scrotum is normal. Testes are descended bilaterally with no evidence of abnormal masses or tenderness. Epididymis, vas deferens, and cord structures are normal bilaterally.  Testicular volume is approximately 17 cc bilaterally.    Extremities: No cyanosis, clubbing, or edema.    IMPRESSION & PLAN:    Mr. Chin is a 33 y.o.  male who has been  to his wife for the past 6 years. They have been trying to achieve a pregnancy for the past 1 years but without success. Jagdeep Chin has not undergone a semen analysis. He denies a history of erectile dysfunction and ejaculatory problems.    He has achieved 0 pregnancies in the past.    1.  FSH, LH, testosterone, prolactin, and estradiol serum levels today.  2.  Semen analysis x 2.  3.  Return to the clinic in 3 weeks to discuss test results and treatment plan.  4.  Recommend avoiding "wet heat."  5.  Recommend taking a multivitamin and 500 mg of vitamin c daily in addition to the multivitamin.  6.  Please send a copy of the note to Dr. Orr.  Thank you for the consultation.    CC: Kirby      "

## 2023-11-08 ENCOUNTER — OFFICE VISIT (OUTPATIENT)
Dept: UROLOGY | Facility: CLINIC | Age: 33
End: 2023-11-08
Payer: COMMERCIAL

## 2023-11-08 VITALS
WEIGHT: 195 LBS | HEART RATE: 64 BPM | BODY MASS INDEX: 27.3 KG/M2 | SYSTOLIC BLOOD PRESSURE: 107 MMHG | DIASTOLIC BLOOD PRESSURE: 62 MMHG | HEIGHT: 71 IN

## 2023-11-08 DIAGNOSIS — E29.1 TESTICULAR FAILURE: Primary | ICD-10-CM

## 2023-11-08 PROCEDURE — 99999 PR PBB SHADOW E&M-EST. PATIENT-LVL III: ICD-10-PCS | Mod: PBBFAC,,, | Performed by: UROLOGY

## 2023-11-08 PROCEDURE — 99999 PR PBB SHADOW E&M-EST. PATIENT-LVL III: CPT | Mod: PBBFAC,,, | Performed by: UROLOGY

## 2023-11-08 PROCEDURE — 3078F DIAST BP <80 MM HG: CPT | Mod: CPTII,S$GLB,, | Performed by: UROLOGY

## 2023-11-08 PROCEDURE — 3008F PR BODY MASS INDEX (BMI) DOCUMENTED: ICD-10-PCS | Mod: CPTII,S$GLB,, | Performed by: UROLOGY

## 2023-11-08 PROCEDURE — 3074F SYST BP LT 130 MM HG: CPT | Mod: CPTII,S$GLB,, | Performed by: UROLOGY

## 2023-11-08 PROCEDURE — 1159F MED LIST DOCD IN RCRD: CPT | Mod: CPTII,S$GLB,, | Performed by: UROLOGY

## 2023-11-08 PROCEDURE — 1159F PR MEDICATION LIST DOCUMENTED IN MEDICAL RECORD: ICD-10-PCS | Mod: CPTII,S$GLB,, | Performed by: UROLOGY

## 2023-11-08 PROCEDURE — 1160F PR REVIEW ALL MEDS BY PRESCRIBER/CLIN PHARMACIST DOCUMENTED: ICD-10-PCS | Mod: CPTII,S$GLB,, | Performed by: UROLOGY

## 2023-11-08 PROCEDURE — 99214 OFFICE O/P EST MOD 30 MIN: CPT | Mod: S$GLB,,, | Performed by: UROLOGY

## 2023-11-08 PROCEDURE — 1160F RVW MEDS BY RX/DR IN RCRD: CPT | Mod: CPTII,S$GLB,, | Performed by: UROLOGY

## 2023-11-08 PROCEDURE — 3008F BODY MASS INDEX DOCD: CPT | Mod: CPTII,S$GLB,, | Performed by: UROLOGY

## 2023-11-08 PROCEDURE — 3078F PR MOST RECENT DIASTOLIC BLOOD PRESSURE < 80 MM HG: ICD-10-PCS | Mod: CPTII,S$GLB,, | Performed by: UROLOGY

## 2023-11-08 PROCEDURE — 99214 PR OFFICE/OUTPT VISIT, EST, LEVL IV, 30-39 MIN: ICD-10-PCS | Mod: S$GLB,,, | Performed by: UROLOGY

## 2023-11-08 PROCEDURE — 3074F PR MOST RECENT SYSTOLIC BLOOD PRESSURE < 130 MM HG: ICD-10-PCS | Mod: CPTII,S$GLB,, | Performed by: UROLOGY

## 2023-11-08 NOTE — PROGRESS NOTES
"Chief Complaint:  Infertility    HPI:    Mr. Chin is a 33 y.o.  male who has been  to his wife for the past 6 years. They have been trying to achieve a pregnancy for the past 1 years but without success. Jagdeep Chin has undergone a semen analysis x 2 showing an isolated morphology defect. He denies a history of erectile dysfunction and ejaculatory problems.    Lab Results   Component Value Date    TOTALTESTOST 345 10/19/2023    LABLH 2.0 10/19/2023    FSH 5.19 10/19/2023    ESTRADIOL 19 10/19/2023    PROLACTIN 7.4 10/19/2023     SA 10/27/23--3.6 cc/110.5 million per cc/60%/6%  SA 23--2.5 cc/71 million per cc/82%/4.5%    FOR REVIEW FROM PREVIOUS:    Mr. Chin is a 33 y.o.  male who has been  to his wife for the past 6 years. They have been trying to achieve a pregnancy for the past 1 years but without success. Jagdeep Chin has not undergone a semen analysis. He denies a history of erectile dysfunction and ejaculatory problems.    He has achieved 0 pregnancies in the past.    Armida Chin is 33 years old. ( 89) Her menses are regular. She has not undergone prior hysterosalpingogram. She has achieved 0 prior pregnancies.  She sees Dr. Orr    The couple has not undergone prior intrauterine insemination procedures.    The couple has not undergone prior in-vitro fertilization procedures.    Jagdeep Chin denies a history of exposure to harmful chemicals, toxins, and radiation.    No history of recent fevers greater than 101.5 degrees Farenheit.    No history of recent exposure to "wet heat."    No history of urological trauma or testicular torsion.    No history of prostatitis, epididymitis, and orchitis.    No history of post-pubertal mumps.    There is no known family history of fertility problems.    REVIEW OF SYSTEMS:     He denies headache, blurred vision, fever, nausea, vomiting, chills, abdominal pain, chest pain, sore throat, bleeding per rectum, cough, SOB, " recent loss of consciousness, recent mental status changes, seizures, dizziness, or upper or lower extremity weakness.    PHYSICAL EXAM:     The patient generally appears in good health, is appropriately interactive, and is in no apparent distress.     Eyes: anicteric sclerae, moist conjunctivae; no lid-lag; PERRLA     HENT: Atraumatic; oropharynx clear with moist mucous membranes and no mucosal ulcerations;normal hard and soft palate.  No evidence of lymphadenopathy.    Neck: Trachea midline.  No thyromegaly.    Skin: No lesions.    Mental: Cooperative with normal affect.  Is oriented to time, place, and person.    Neuro: Grossly intact.    Chest: Normal inspiratory effort.   No accessory muscles.  No audible wheezes.  Respirations symmetric on inspiration and expiration.    Heart: Regular rhythm.      Abdomen:  Soft, non-tender. No masses or organomegaly. Bladder is not palpable. No evidence of flank discomfort. No evidence of inguinal hernia.    Genitourinary: Penis is normal with no evidence of plaques or induration. Urethral meatus is normal. Scrotum is normal. Testes are descended bilaterally with no evidence of abnormal masses or tenderness. Epididymis, vas deferens, and cord structures are normal bilaterally.  Testicular volume is approximately 17 cc bilaterally.    Extremities: No cyanosis, clubbing, or edema.    IMPRESSION & PLAN:    Mr. Chin is a 33 y.o.  male who has been  to his wife for the past 6 years. They have been trying to achieve a pregnancy for the past 1 years but without success. Jagdeep Chin has undergone a semen analysis x 2 showing an isolated morphology defect. He denies a history of erectile dysfunction and ejaculatory problems.    Lab Results   Component Value Date    TOTALTESTOST 345 10/19/2023    LABLH 2.0 10/19/2023    FSH 5.19 10/19/2023    ESTRADIOL 19 10/19/2023    PROLACTIN 7.4 10/19/2023     SA 10/27/23--3.6 cc/110.5 million per cc/60%/6%  SA 11/1/23--2.5 cc/71  "million per cc/82%/4.5%    1.  Independently interpreted his labs and outside SA's today.  2.  From a male factor perspective efforts with natural means, IUI, and IVF are all appropriate.  3.  RTC 6 months if not pregnant by then.  4.  Recommend avoiding "wet heat."  5.  Recommend taking a multivitamin and 500 mg of vitamin c daily in addition to the multivitamin.    CC: Kirby      "

## 2023-11-08 NOTE — LETTER
November 8, 2023        Fabi Motley MD  [address] [facility] [place] [address]             [facility] 4th Fl  [address] [facility] [address]  Phone: [phone]   Patient: Jagdeep Chin   MR Number: [id number]   YOB: 1990   Date of Visit: 11/8/2023       Dear Dr. Motley:    Thank you for referring Jagdeep Chin to me for evaluation. Attached you will find relevant portions of my assessment and plan of care.    If you have questions, please do not hesitate to call me. I look forward to following Jagdeep Chin along with you.    Sincerely,      Aron Shin MD            CC  No Recipients    Enclosure